# Patient Record
Sex: MALE | Race: WHITE
[De-identification: names, ages, dates, MRNs, and addresses within clinical notes are randomized per-mention and may not be internally consistent; named-entity substitution may affect disease eponyms.]

---

## 2019-02-02 ENCOUNTER — HOSPITAL ENCOUNTER (EMERGENCY)
Dept: HOSPITAL 36 - ER | Age: 49
LOS: 1 days | Discharge: HOME | End: 2019-02-03
Payer: MEDICAID

## 2019-02-02 DIAGNOSIS — F15.10: ICD-10-CM

## 2019-02-02 DIAGNOSIS — F10.10: ICD-10-CM

## 2019-02-02 DIAGNOSIS — F17.200: ICD-10-CM

## 2019-02-02 DIAGNOSIS — F29: Primary | ICD-10-CM

## 2019-02-02 DIAGNOSIS — Z59.0: ICD-10-CM

## 2019-02-02 LAB
ALBUMIN SERPL-MCNC: 3.8 GM/DL (ref 4.2–5.5)
ALBUMIN/GLOB SERPL: 1.4 {RATIO} (ref 1–1.8)
ALP SERPL-CCNC: 85 U/L (ref 34–104)
ALT SERPL-CCNC: 12 U/L (ref 7–52)
ANION GAP SERPL CALC-SCNC: 15.3 MMOL/L (ref 7–16)
AST SERPL-CCNC: 15 U/L (ref 13–39)
BASOPHILS # BLD AUTO: 0 TH/CUMM (ref 0–0.2)
BASOPHILS NFR BLD AUTO: 0.4 % (ref 0–2)
BILIRUB SERPL-MCNC: 0.2 MG/DL (ref 0.3–1)
BUN SERPL-MCNC: 18 MG/DL (ref 7–25)
CALCIUM SERPL-MCNC: 8.6 MG/DL (ref 8.6–10.3)
CHLORIDE SERPL-SCNC: 109 MEQ/L (ref 98–107)
CO2 SERPL-SCNC: 24.1 MEQ/L (ref 21–31)
CREAT SERPL-MCNC: 0.6 MG/DL (ref 0.7–1.3)
EOSINOPHIL # BLD AUTO: 0.1 TH/CMM (ref 0.1–0.4)
EOSINOPHIL NFR BLD AUTO: 1.6 % (ref 0–5)
ERYTHROCYTE [DISTWIDTH] IN BLOOD BY AUTOMATED COUNT: 12.1 % (ref 11.5–20)
GLOBULIN SER-MCNC: 2.8 GM/DL
GLUCOSE SERPL-MCNC: 112 MG/DL (ref 70–105)
HCT VFR BLD CALC: 42.7 % (ref 41–60)
HGB BLD-MCNC: 14.1 GM/DL (ref 12–16)
INR PPP: 0.87 (ref 0.5–1.4)
LYMPHOCYTE AB SER FC-ACNC: 1.8 TH/CMM (ref 1.5–3)
LYMPHOCYTES NFR BLD AUTO: 20.4 % (ref 20–50)
MCH RBC QN AUTO: 31.6 PG (ref 26–30)
MCHC RBC AUTO-ENTMCNC: 32.9 PG (ref 28–36)
MCV RBC AUTO: 95.8 FL (ref 80–99)
MONOCYTES # BLD AUTO: 0.3 TH/CMM (ref 0.3–1)
MONOCYTES NFR BLD AUTO: 3.8 % (ref 2–10)
NEUTROPHILS # BLD: 6.6 TH/CMM (ref 1.8–8)
NEUTROPHILS NFR BLD AUTO: 73.8 % (ref 40–80)
PLATELET # BLD: 451 TH/CMM (ref 150–400)
PMV BLD AUTO: 7.3 FL
POTASSIUM SERPL-SCNC: 3.4 MEQ/L (ref 3.5–5.1)
PROTHROMBIN TIME: 9.2 SECONDS (ref 9.5–11.5)
RBC # BLD AUTO: 4.46 MIL/CMM (ref 4.3–5.7)
SODIUM SERPL-SCNC: 145 MEQ/L (ref 136–145)
WBC # BLD AUTO: 8.8 TH/CMM (ref 4.8–10.8)

## 2019-02-02 PROCEDURE — Z7502: HCPCS

## 2019-02-02 SDOH — ECONOMIC STABILITY - HOUSING INSECURITY: HOMELESSNESS: Z59.0
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
## 2019-02-03 LAB
AMPHET UR-MCNC: POSITIVE NG/ML
APPEARANCE UR: CLEAR
BACTERIA #/AREA URNS HPF: (no result) /HPF
BARBITURATES UR-MCNC: NEGATIVE UG/ML
BENZODIAZEPINES PNL UR: NEGATIVE
BILIRUB UR-MCNC: NEGATIVE MG/DL
CANNABINOIDS SERPL QL CFM: NEGATIVE
COCAINE METAB.OTHER UR-MCNC: NEGATIVE NG/ML
COLOR UR: YELLOW
EPI CELLS URNS QL MICRO: (no result) /LPF
GLUCOSE UR STRIP-MCNC: NEGATIVE MG/DL
KETONES UR STRIP-MCNC: NEGATIVE MG/DL
LEUKOCYTE ESTERASE UR-ACNC: NEGATIVE
METHADONE UR CFM-MCNC: NEGATIVE NG/ML
METHAMPHET UR QL: NEGATIVE
MICRO URNS: YES
NITRITE UR QL STRIP: NEGATIVE
OPIATES UR QL: NEGATIVE
PCP UR-MCNC: NEGATIVE UG/L
PH UR STRIP: 6.5 [PH] (ref 4.6–8)
PROT UR STRIP-MCNC: NEGATIVE MG/DL
RBC # UR STRIP: (no result) /UL
RBC #/AREA URNS HPF: (no result) /HPF (ref 0–5)
SP GR UR STRIP: 1.02 (ref 1–1.03)
TRICYCLICS UR QL: NEGATIVE
URINALYSIS COMPLETE PNL UR: (no result)
UROBILINOGEN UR STRIP-ACNC: 0.2 E.U./DL (ref 0.2–1)
WBC #/AREA URNS HPF: (no result) /HPF (ref 0–5)
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
